# Patient Record
Sex: MALE | Race: BLACK OR AFRICAN AMERICAN | NOT HISPANIC OR LATINO | URBAN - METROPOLITAN AREA
[De-identification: names, ages, dates, MRNs, and addresses within clinical notes are randomized per-mention and may not be internally consistent; named-entity substitution may affect disease eponyms.]

---

## 2018-02-07 ENCOUNTER — EMERGENCY (EMERGENCY)
Facility: HOSPITAL | Age: 18
LOS: 1 days | Discharge: HOME | End: 2018-02-07
Attending: EMERGENCY MEDICINE
Payer: COMMERCIAL

## 2018-02-07 VITALS
SYSTOLIC BLOOD PRESSURE: 128 MMHG | HEART RATE: 88 BPM | OXYGEN SATURATION: 98 % | RESPIRATION RATE: 19 BRPM | DIASTOLIC BLOOD PRESSURE: 84 MMHG

## 2018-02-07 VITALS
RESPIRATION RATE: 18 BRPM | DIASTOLIC BLOOD PRESSURE: 89 MMHG | OXYGEN SATURATION: 99 % | SYSTOLIC BLOOD PRESSURE: 122 MMHG | HEART RATE: 85 BPM | TEMPERATURE: 97 F

## 2018-02-07 PROCEDURE — 73610 X-RAY EXAM OF ANKLE: CPT | Mod: 26,RT

## 2018-02-07 PROCEDURE — 99283 EMERGENCY DEPT VISIT LOW MDM: CPT | Mod: 25

## 2018-02-07 PROCEDURE — 99283 EMERGENCY DEPT VISIT LOW MDM: CPT

## 2018-02-07 PROCEDURE — 73610 X-RAY EXAM OF ANKLE: CPT

## 2018-02-07 RX ORDER — ACETAMINOPHEN 500 MG
650 TABLET ORAL ONCE
Qty: 0 | Refills: 0 | Status: COMPLETED | OUTPATIENT
Start: 2018-02-07 | End: 2018-02-07

## 2018-02-07 RX ADMIN — Medication 650 MILLIGRAM(S): at 19:27
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
## 2018-02-07 NOTE — ED PROVIDER NOTE - MEDICAL DECISION MAKING DETAILS
MD Brian,Attending: pt seen and examined, agree with above HPI/ROS/PE. primary complaint was back pain --minimal tenderness in upper lumbar area bilat. No significant spinous process tenderness. FROM. able to ambulate without significant discomfort until doing deep knee bend after which R ankle pain developed. For tylenol for pain and R ankle xrays. Rest of exam contraindicates need for further imaging/labwork. Unlikely fracture of ankle. MD Brian,Attending: pt seen and examined, agree with above HPI/ROS/PE. primary complaint was back pain --minimal tenderness in upper lumbar area bilat. No significant spinous process tenderness. FROM. able to ambulate without significant discomfort until doing deep knee bend after which R ankle pain developed. For tylenol for pain and R ankle xrays. Rest of exam contraindicates need for further imaging/ labwork. Unlikely fracture of ankle.
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
